# Patient Record
Sex: FEMALE | Race: WHITE | NOT HISPANIC OR LATINO
[De-identification: names, ages, dates, MRNs, and addresses within clinical notes are randomized per-mention and may not be internally consistent; named-entity substitution may affect disease eponyms.]

---

## 2022-09-19 ENCOUNTER — APPOINTMENT (OUTPATIENT)
Dept: ORTHOPEDIC SURGERY | Facility: CLINIC | Age: 37
End: 2022-09-19

## 2022-09-19 PROBLEM — Z00.00 ENCOUNTER FOR PREVENTIVE HEALTH EXAMINATION: Status: ACTIVE | Noted: 2022-09-19

## 2022-09-19 PROCEDURE — 26755 TREAT FINGER FRACTURE EACH: CPT

## 2022-09-19 PROCEDURE — 73140 X-RAY EXAM OF FINGER(S): CPT | Mod: LT

## 2022-09-19 PROCEDURE — 99204 OFFICE O/P NEW MOD 45 MIN: CPT | Mod: 25

## 2022-09-19 NOTE — HISTORY OF PRESENT ILLNESS
[de-identified] : 9/19/22:  Pt injured her left middle finger. unknown mechanism of injury\par LHD\par . shoots right handed

## 2022-09-19 NOTE — ASSESSMENT
[FreeTextEntry1] : The patient was advised of the diagnosis. The natural history of the pathology was explained in full to the patient in layman's terms. All questions were answered. The risks and benefits of surgical and non-surgical treatment alternatives were explained in full to the patient.\par s/p digital nerve block left middle finger distal phalanx is reduced. \par Post reduction xray shows improved alignment.\par

## 2022-09-19 NOTE — DATA REVIEWED
[Outside X-rays] : outside x-rays [Left] : left [Hand] : hand [I independently reviewed and interpreted images and report] : I independently reviewed and interpreted images and report [FreeTextEntry1] : displaced fracture of distal phalanx of left middle finger

## 2022-09-19 NOTE — IMAGING
[de-identified] : left middle finger:\par swelling and ecchymosis over PIP and DIP\par damage to nail\par ttp over distal phalanx\par rom not assessed due to pain\par nvid

## 2022-09-29 ENCOUNTER — APPOINTMENT (OUTPATIENT)
Dept: ORTHOPEDIC SURGERY | Facility: CLINIC | Age: 37
End: 2022-09-29

## 2022-09-29 PROCEDURE — 73140 X-RAY EXAM OF FINGER(S): CPT | Mod: LT

## 2022-09-29 PROCEDURE — 99024 POSTOP FOLLOW-UP VISIT: CPT

## 2022-09-29 PROCEDURE — 29130 APPL FINGER SPLINT STATIC: CPT | Mod: F2

## 2022-09-29 NOTE — HISTORY OF PRESENT ILLNESS
[de-identified] : 9/29/22:  Pt went to an  on 9/22 because she felt her finger was infected.  She was given Keflex\par \par 9/19/22:  Pt injured her left middle finger. unknown mechanism of injury\par LHD\par . shoots right handed [FreeTextEntry1] : left hand

## 2022-09-29 NOTE — ASSESSMENT
[FreeTextEntry1] : The patient was advised of the diagnosis. The natural history of the pathology was explained in full to the patient in layman's terms. All questions were answered. The risks and benefits of surgical and non-surgical treatment alternatives were explained in full to the patient.\par \par Pt will wear finger splint for 2 weeks.\par f/u in 2 weeks for repeat xrays

## 2022-09-29 NOTE — IMAGING
[de-identified] : left middle finger:\par swelling and ecchymosis over PIP and DIP\par damage to nail\par ttp over distal phalanx\par rom not assessed due to pain\par no signs of infection\par nvid

## 2022-09-30 ENCOUNTER — TRANSCRIPTION ENCOUNTER (OUTPATIENT)
Age: 37
End: 2022-09-30

## 2022-10-13 ENCOUNTER — APPOINTMENT (OUTPATIENT)
Dept: ORTHOPEDIC SURGERY | Facility: CLINIC | Age: 37
End: 2022-10-13

## 2022-10-13 VITALS — BODY MASS INDEX: 23.7 KG/M2 | HEIGHT: 69 IN | WEIGHT: 160 LBS

## 2022-10-13 DIAGNOSIS — Z78.9 OTHER SPECIFIED HEALTH STATUS: ICD-10-CM

## 2022-10-13 PROCEDURE — 99024 POSTOP FOLLOW-UP VISIT: CPT

## 2022-10-13 PROCEDURE — 73140 X-RAY EXAM OF FINGER(S): CPT | Mod: LT

## 2022-10-13 NOTE — HISTORY OF PRESENT ILLNESS
[4] : 4 [2] : 2 [Dull/Aching] : dull/aching [Localized] : localized [Sharp] : sharp [Intermittent] : intermittent [Not working due to injury] : Work status: not working due to injury [de-identified] : DOI: 9/17/2022\par \par 10/13/2022: Pt states that her left middle finger pain has improved. \par \par 9/29/22:  Pt went to an UC on 9/22 because she felt her finger was infected.  She was given Keflex\par \par 9/19/22:  Pt injured her left middle finger. unknown mechanism of injury\par LHD\par . shoots right handed [] : no [FreeTextEntry1] : left hand

## 2022-10-13 NOTE — REASON FOR VISIT
[FreeTextEntry2] : 10/13/2022 :FRANKIE CONTRERAS , a 37 year old female, presents today for left middle finger fracture\par

## 2022-10-13 NOTE — IMAGING
[Left] : left fingers [de-identified] : left middle finger:\par swelling and ecchymosis over PIP and DIP\par resolving subungual hematoma is noted.\par FDP/FDS and extensor tendon function is intact.\par Mild ttp over distal phalanx\par rom not assessed due to pain\par no signs of infection\par nvid [FreeTextEntry9] : Left middle finger

## 2022-10-13 NOTE — ASSESSMENT
[FreeTextEntry1] : The patient was advised of the diagnosis. The natural history of the pathology was explained in full to the patient in layman's terms. All questions were answered. The risks and benefits of surgical and non-surgical treatment alternatives were explained in full to the patient.\par \par Start PT

## 2022-10-31 ENCOUNTER — RESULT CHARGE (OUTPATIENT)
Age: 37
End: 2022-10-31

## 2022-10-31 ENCOUNTER — APPOINTMENT (OUTPATIENT)
Dept: ORTHOPEDIC SURGERY | Facility: CLINIC | Age: 37
End: 2022-10-31

## 2022-10-31 ENCOUNTER — NON-APPOINTMENT (OUTPATIENT)
Age: 37
End: 2022-10-31

## 2022-10-31 VITALS — WEIGHT: 160 LBS | HEIGHT: 69 IN | BODY MASS INDEX: 23.7 KG/M2

## 2022-10-31 DIAGNOSIS — S62.633A DISPLACED FRACTURE OF DISTAL PHALANX OF LEFT MIDDLE FINGER, INITIAL ENCOUNTER FOR CLOSED FRACTURE: ICD-10-CM

## 2022-10-31 DIAGNOSIS — Z78.9 OTHER SPECIFIED HEALTH STATUS: ICD-10-CM

## 2022-10-31 PROCEDURE — 73140 X-RAY EXAM OF FINGER(S): CPT | Mod: LT

## 2022-10-31 PROCEDURE — 99024 POSTOP FOLLOW-UP VISIT: CPT

## 2022-10-31 NOTE — IMAGING
[Left] : left fingers [de-identified] : left middle finger:\par swelling and ecchymosis over PIP and DIP\par resolving subungual hematoma is noted.\par FDP/FDS and extensor tendon function is intact.\par Mild ttp over distal phalanx\par 70 degrees of flexion\par no signs of infection\par nvid [FreeTextEntry9] : Left middle finger

## 2022-10-31 NOTE — HISTORY OF PRESENT ILLNESS
[de-identified] : DOI: 9/17/2022\par \par 10/31/22:  Pt has been doing therapy and is improving\par \par 10/13/2022: Pt states that her left middle finger pain has improved. \par \par 9/29/22:  Pt went to an UC on 9/22 because she felt her finger was infected.  She was given Keflex\par \par 9/19/22:  Pt injured her left middle finger. unknown mechanism of injury\par LHD\par . shoots right handed

## 2022-10-31 NOTE — ASSESSMENT
[FreeTextEntry1] : The patient was advised of the diagnosis. The natural history of the pathology was explained in full to the patient in layman's terms. All questions were answered. The risks and benefits of surgical and non-surgical treatment alternatives were explained in full to the patient.\par \par Continue PT

## 2022-11-02 ENCOUNTER — FORM ENCOUNTER (OUTPATIENT)
Age: 37
End: 2022-11-02

## 2023-01-03 ENCOUNTER — APPOINTMENT (OUTPATIENT)
Dept: ORTHOPEDIC SURGERY | Facility: CLINIC | Age: 38
End: 2023-01-03